# Patient Record
Sex: FEMALE | Race: WHITE | Employment: OTHER | URBAN - METROPOLITAN AREA
[De-identification: names, ages, dates, MRNs, and addresses within clinical notes are randomized per-mention and may not be internally consistent; named-entity substitution may affect disease eponyms.]

---

## 2017-10-06 ENCOUNTER — FOLLOW UP (OUTPATIENT)
Dept: URBAN - METROPOLITAN AREA CLINIC 26 | Facility: CLINIC | Age: 82
End: 2017-10-06

## 2017-10-06 VITALS
SYSTOLIC BLOOD PRESSURE: 140 MMHG | DIASTOLIC BLOOD PRESSURE: 82 MMHG | BODY MASS INDEX: 30.13 KG/M2 | HEIGHT: 67 IN | WEIGHT: 192 LBS | HEART RATE: 78 BPM

## 2017-10-06 DIAGNOSIS — H04.123: ICD-10-CM

## 2017-10-06 DIAGNOSIS — H43.813: ICD-10-CM

## 2017-10-06 DIAGNOSIS — H35.3222: ICD-10-CM

## 2017-10-06 DIAGNOSIS — H35.3111: ICD-10-CM

## 2017-10-06 DIAGNOSIS — D31.31: ICD-10-CM

## 2017-10-06 DIAGNOSIS — E11.9: ICD-10-CM

## 2017-10-06 PROCEDURE — 92014 COMPRE OPH EXAM EST PT 1/>: CPT

## 2017-10-06 PROCEDURE — G8427 DOCREV CUR MEDS BY ELIG CLIN: HCPCS

## 2017-10-06 PROCEDURE — 92226 OPHTHALMOSCOPY (SUB): CPT

## 2017-10-06 PROCEDURE — 2022F DILAT RTA XM EVC RTNOPTHY: CPT

## 2017-10-06 PROCEDURE — 1036F TOBACCO NON-USER: CPT

## 2017-10-06 PROCEDURE — 4177F TALK PT/CRGVR RE AREDS PREV: CPT

## 2017-10-06 PROCEDURE — G8417 CALC BMI ABV UP PARAM F/U: HCPCS

## 2017-10-06 PROCEDURE — 92250 FUNDUS PHOTOGRAPHY W/I&R: CPT

## 2017-10-06 PROCEDURE — 92235 FLUORESCEIN ANGRPH MLTIFRAME: CPT

## 2017-10-06 PROCEDURE — 92134 CPTRZ OPH DX IMG PST SGM RTA: CPT

## 2017-10-06 PROCEDURE — 2019F DILATED MACUL EXAM DONE: CPT

## 2017-10-06 ASSESSMENT — VISUAL ACUITY
OS_PH: 20/25
OD_SC: 20/25
OS_SC: 20/30

## 2017-10-06 ASSESSMENT — TONOMETRY
OD_IOP_MMHG: 11
OS_IOP_MMHG: 13

## 2018-01-08 NOTE — PATIENT DISCUSSION
Discussed with the patient the prophylactic/therapeutic indications for Laser Peripheral Iridotomy (LPI) procedure for this condition.

## 2019-03-12 ENCOUNTER — FOLLOW UP (OUTPATIENT)
Dept: URBAN - METROPOLITAN AREA CLINIC 26 | Facility: CLINIC | Age: 84
End: 2019-03-12

## 2019-03-12 VITALS
DIASTOLIC BLOOD PRESSURE: 78 MMHG | SYSTOLIC BLOOD PRESSURE: 149 MMHG | WEIGHT: 190 LBS | HEIGHT: 66 IN | HEART RATE: 87 BPM | BODY MASS INDEX: 30.53 KG/M2

## 2019-03-12 DIAGNOSIS — D31.31: ICD-10-CM

## 2019-03-12 DIAGNOSIS — H35.3222: ICD-10-CM

## 2019-03-12 DIAGNOSIS — E11.9: ICD-10-CM

## 2019-03-12 DIAGNOSIS — H43.813: ICD-10-CM

## 2019-03-12 DIAGNOSIS — H35.3111: ICD-10-CM

## 2019-03-12 PROCEDURE — 92250 FUNDUS PHOTOGRAPHY W/I&R: CPT

## 2019-03-12 PROCEDURE — 92014 COMPRE OPH EXAM EST PT 1/>: CPT

## 2019-03-12 PROCEDURE — 92235 FLUORESCEIN ANGRPH MLTIFRAME: CPT

## 2019-03-12 ASSESSMENT — VISUAL ACUITY
OD_SC: 20/30
OS_SC: 20/40

## 2019-03-12 ASSESSMENT — TONOMETRY
OD_IOP_MMHG: 7
OS_IOP_MMHG: 9

## 2022-07-30 ENCOUNTER — TELEPHONE ENCOUNTER (OUTPATIENT)
Age: 87
End: 2022-07-30

## 2022-07-31 ENCOUNTER — TELEPHONE ENCOUNTER (OUTPATIENT)
Age: 87
End: 2022-07-31

## 2023-03-22 NOTE — PATIENT DISCUSSION
Cataract(s) are not yet visually significant to the patient. Recommend monitoring, and patient agrees with this plan. What patient describes as a keloid now presents as an atrophic scar.\\nPatient reports history of keloid (unknown trauma/cause that led to keloid) and subsequent surgical excision of keloid after it rapidly grew during pregnancy.\\nShe also reports a keloid of left upper back/posterior shoulder. Detail Level: Zone Discussed cosmetic shave removal. \\nDiscussed R/B/A including but not limited to: discomfort, incomplete removal, recurrence, hypopigmentation, hyperpigmentation, scar, keloid formation \\n\\nDiscussed at length that face tends to heal well but if patient is prone to keloids, she may be at risk of forming keloids in other areas after surgery or trauma.\\nThere is no way to predict if or where she may form keloids in the future.\\n\\n[employer] pricing discussed as cosmetic shave removal is based on size of lesion and body location. Lab fee also discussed. Patient made aware that pricing is subject to change at any time.\\n\\nPatient wants to go home and discuss with her  and will decide if she wants to pursue treatment.